# Patient Record
Sex: FEMALE
[De-identification: names, ages, dates, MRNs, and addresses within clinical notes are randomized per-mention and may not be internally consistent; named-entity substitution may affect disease eponyms.]

---

## 2022-10-17 ENCOUNTER — HOME CARE VISIT (OUTPATIENT)
Dept: SCHEDULING | Facility: HOME HEALTH | Age: 87
End: 2022-10-17

## 2022-10-17 ENCOUNTER — HOME HEALTH ADMISSION (OUTPATIENT)
Dept: HOME HEALTH SERVICES | Facility: HOME HEALTH | Age: 87
End: 2022-10-17
Payer: MEDICARE

## 2022-10-17 VITALS
HEART RATE: 80 BPM | DIASTOLIC BLOOD PRESSURE: 78 MMHG | TEMPERATURE: 97.8 F | RESPIRATION RATE: 18 BRPM | SYSTOLIC BLOOD PRESSURE: 138 MMHG | OXYGEN SATURATION: 98 %

## 2022-10-17 PROCEDURE — 0221000100 HH NO PAY CLAIM PROCEDURE

## 2022-10-17 PROCEDURE — G0299 HHS/HOSPICE OF RN EA 15 MIN: HCPCS

## 2022-10-17 ASSESSMENT — ENCOUNTER SYMPTOMS
DYSPNEA ACTIVITY LEVEL: AFTER AMBULATING 10 - 20 FT
PAIN LOCATION - PAIN QUALITY: ACHY
HEMOPTYSIS: 0

## 2022-10-17 NOTE — HOME HEALTH
Patient seen today fo cellulitis. Pt resides in residential with 24hr caregiver assiustance. Pt is alert and oreinted to person and place. VSS, lungs cta, active bowel sounds, trace of edema noted BLE. Pt is taking clindamycin for 7days. Redness noted to LLE. Scabbed area noted to LLE 0 drainage noted. Pt has a hx of HTN, hiatal hernia, DM2 controlled with diet, CAD. POC approval received from PCP. Wound Care Provided per care plan. Pt tolerated well. Caregiver involvement: [facility] staff    Medications reconciled and all medications are available. MD notified of Severe medication interaction. No new orders at this time. Patient education provided this visit: SN educated patient and patient's caregiver on s/s of infection, fall prevention and medication .  Patient and patient caregiver verbalizes understanding via the teach back method.      Progress toward goals: progressing well    Home exercise program:     Plan for next visit: monitoring cellulitis    The following discharge planning was discussed with the patient/ patient's caregiver: DC when goals met

## 2022-10-20 ENCOUNTER — HOME CARE VISIT (OUTPATIENT)
Dept: SCHEDULING | Facility: HOME HEALTH | Age: 87
End: 2022-10-20

## 2022-10-20 PROCEDURE — G0300 HHS/HOSPICE OF LPN EA 15 MIN: HCPCS

## 2022-10-21 VITALS
HEART RATE: 66 BPM | OXYGEN SATURATION: 93 % | TEMPERATURE: 97.9 F | RESPIRATION RATE: 16 BRPM | SYSTOLIC BLOOD PRESSURE: 132 MMHG | DIASTOLIC BLOOD PRESSURE: 70 MMHG

## 2022-10-21 ASSESSMENT — ENCOUNTER SYMPTOMS
HEMOPTYSIS: 0
BOWEL INCONTINENCE: 1

## 2022-10-21 NOTE — HOME HEALTH
Patient with wound, edema, mobility and cognitive impairment    Caregiver involvement: resides in group home     Patient education provided this visit: SN educated patient and patient's caregiver on anxiety and pain managemnet techniques. Patient and patient caregiver verbalizes understanding via the teach back method.      Progress toward goals: progressing well client alert oriented times two, occasional confusion, vital signs stable, client denies pain at this time, reddened area noted to lower extremity, no drainage noted, no signs or symptoms of infection noted, edema to bilateral lower extremitied, lumg sounds clear denies cough and shortness of breath, fall precautions maintaiend     Plan for next visit: check skin integrity    discharge planning was discussed with the patient/ patient's caregiver: DC when goals met

## 2022-10-25 ENCOUNTER — HOME CARE VISIT (OUTPATIENT)
Dept: SCHEDULING | Facility: HOME HEALTH | Age: 87
End: 2022-10-25
Payer: MEDICARE

## 2022-10-25 VITALS
SYSTOLIC BLOOD PRESSURE: 134 MMHG | RESPIRATION RATE: 16 BRPM | DIASTOLIC BLOOD PRESSURE: 70 MMHG | HEART RATE: 66 BPM | TEMPERATURE: 98.1 F | OXYGEN SATURATION: 96 %

## 2022-10-25 PROCEDURE — G0300 HHS/HOSPICE OF LPN EA 15 MIN: HCPCS

## 2022-10-25 ASSESSMENT — ENCOUNTER SYMPTOMS
BOWEL INCONTINENCE: 1
HEMOPTYSIS: 0

## 2022-10-25 NOTE — HOME HEALTH
Patient with dry area to lower extremity, mobility and cogniitve impairment   Client resides in [facility]   Patient education provided this visit: SN educated patient and patient's caregiver on safe utiliazation of mobility devieces,and safe ambulation. Patient and patient caregiver verbalizes understanding via the teach back method.    Progress toward goals: progressing well client alert, occasional disorientation noted, vital signs stable, dry area noted to lower extremity, no drainage noted, no discomfort noted, trace edema noted, lung sounds clear, no cough noted, fall precautions maintained, covid positive   Plan for next visit: follow up on respiratory status   discharge planning was discussed with the patient/ patient's caregiver: DC when goals met

## 2022-10-27 ENCOUNTER — HOME CARE VISIT (OUTPATIENT)
Dept: SCHEDULING | Facility: HOME HEALTH | Age: 87
End: 2022-10-27
Payer: MEDICARE

## 2022-10-27 VITALS
HEART RATE: 57 BPM | SYSTOLIC BLOOD PRESSURE: 116 MMHG | OXYGEN SATURATION: 92 % | RESPIRATION RATE: 16 BRPM | DIASTOLIC BLOOD PRESSURE: 70 MMHG | TEMPERATURE: 97.4 F

## 2022-10-27 PROCEDURE — G0300 HHS/HOSPICE OF LPN EA 15 MIN: HCPCS

## 2022-10-27 ASSESSMENT — ENCOUNTER SYMPTOMS
HEMOPTYSIS: 0
COUGH CHARACTERISTICS: NON-PRODUCTIVE
BOWEL INCONTINENCE: 1
COUGH: 1

## 2022-10-27 NOTE — HOME HEALTH
Patient with mobility, and cognitive impairment, incontinence, trace edema, reddened dry skin     Caregiver involvement: resides in [facility]     Patient education provided this visit: SN educated patient and patient's caregiver on fall prevention and management. Patient and patient caregiver verbalizes understanding via the teach back method.      Progress toward goals: progressing well client alert and oriented times 3, occasional confusion noted, vital signs stable, client denies pain at this time, lung sounds were diminished, dry non productive cough noted, shortnes of breath with communitcation was noted, skin intact dry reddened area noted to lower extremity trace edmea to bilateral lower extremities, fall precautions were maintained, covid positiev     Plan for next visit: respiratory assessmnet     discharge planning was discussed with the patient/ patient's caregiver: DC when goals met

## 2022-10-31 ENCOUNTER — HOME CARE VISIT (OUTPATIENT)
Dept: SCHEDULING | Facility: HOME HEALTH | Age: 87
End: 2022-10-31
Payer: MEDICARE

## 2022-10-31 VITALS
OXYGEN SATURATION: 97 % | DIASTOLIC BLOOD PRESSURE: 76 MMHG | SYSTOLIC BLOOD PRESSURE: 130 MMHG | RESPIRATION RATE: 16 BRPM | HEART RATE: 74 BPM | TEMPERATURE: 98.2 F

## 2022-10-31 PROCEDURE — G0300 HHS/HOSPICE OF LPN EA 15 MIN: HCPCS

## 2022-10-31 ASSESSMENT — ENCOUNTER SYMPTOMS: HEMOPTYSIS: 0

## 2022-10-31 NOTE — HOME HEALTH
Patient with covid 23, mobility and cognitive impairment   client resides in [facility]   Patient education provided this visit: SN educated patient and patient's caregiver on pain management and signs and symptoms of infection. Patient and patient caregiver verbalizes understanding via the teach back method.    Progress toward goals: progressing well client alert and oriented times 2, vital signs were stable, client denies pain at this time, reddened dry itchy area noted to lower extermity no drainage noted no signs or symptoms of infection, no edema noted, lung sounds were clear to auscultation, dry none productive cough noted, no shortness of breath was noted, fall preacutions maintained   Plan for next visit: respiratory assessment, folow up on covid 19   discharge planning was discussed with the patient/ patient's caregiver: DC when goals met

## 2022-11-02 ENCOUNTER — HOME CARE VISIT (OUTPATIENT)
Dept: HOME HEALTH SERVICES | Facility: HOME HEALTH | Age: 87
End: 2022-11-02
Payer: MEDICARE

## 2022-11-02 PROCEDURE — G0299 HHS/HOSPICE OF RN EA 15 MIN: HCPCS

## 2022-11-03 VITALS
DIASTOLIC BLOOD PRESSURE: 76 MMHG | OXYGEN SATURATION: 97 % | RESPIRATION RATE: 16 BRPM | HEART RATE: 74 BPM | TEMPERATURE: 98.2 F | SYSTOLIC BLOOD PRESSURE: 130 MMHG

## 2022-11-03 ASSESSMENT — ENCOUNTER SYMPTOMS
PAIN LOCATION - PAIN QUALITY: ACHY
HEMOPTYSIS: 0
DYSPNEA ACTIVITY LEVEL: AFTER AMBULATING MORE THAN 20 FT

## 2022-11-03 NOTE — HOME HEALTH
Patient with mobility, and cognitive impairment, incontinence, trace edema, COVID, Caregiver involvement: resides in [facility] Patient education provided this visit: SN educated patient and patient's caregiver on COVID precautions, disease process and medications. Patient and patient caregiver verbalizes understanding via the teach back method.  Progress toward goals: progressing well client alert and oriented times 3, occasional confusion noted, VSS, wheezing note right lower lobe and left upper lobe-dry non productive cough noted, shortnes of breath noted with mod exertion  skin intact dry reddened area noted to lower extremity trace edmea to bilateral lower extremities,  covid positiev Plan for next visit: respiratory assessmnet discharge planning was discussed with the patient/ patient's caregiver: DC when goals met

## 2022-11-09 ENCOUNTER — HOME CARE VISIT (OUTPATIENT)
Dept: HOME HEALTH SERVICES | Facility: HOME HEALTH | Age: 87
End: 2022-11-09
Payer: MEDICARE

## 2022-11-09 VITALS
OXYGEN SATURATION: 97 % | DIASTOLIC BLOOD PRESSURE: 74 MMHG | RESPIRATION RATE: 18 BRPM | HEART RATE: 74 BPM | TEMPERATURE: 97.6 F | SYSTOLIC BLOOD PRESSURE: 116 MMHG

## 2022-11-09 PROCEDURE — G0299 HHS/HOSPICE OF RN EA 15 MIN: HCPCS

## 2022-11-09 ASSESSMENT — ENCOUNTER SYMPTOMS
DYSPNEA ACTIVITY LEVEL: AFTER AMBULATING MORE THAN 20 FT
HEMOPTYSIS: 0

## 2022-11-09 NOTE — HOME HEALTH
Patient seen today for sn discharge, goals met. Caregiver involvement: resides in [facility] [place] Patient education provided this visit: SN educated patient and patient's caregiver are kmowledgable  with s/s of  COVID,s/s of infection, disease process and medications. Patient and patient caregiver verbalizes understanding via the teach back method. Progress toward goals: goals met progressing well client alert and oriented times 3, occasional confusion noted, VSS, lungs cta, trace of edemable.    SN goals met discharged from agency

## 2023-05-01 ENCOUNTER — HOME HEALTH ADMISSION (OUTPATIENT)
Dept: HOME HEALTH SERVICES | Facility: HOME HEALTH | Age: 88
End: 2023-05-01
Payer: MEDICARE

## 2023-05-02 ENCOUNTER — HOME CARE VISIT (OUTPATIENT)
Dept: SCHEDULING | Facility: HOME HEALTH | Age: 88
End: 2023-05-02
Payer: MEDICARE

## 2023-05-02 PROCEDURE — G0153 HHCP-SVS OF S/L PATH,EA 15MN: HCPCS

## 2023-05-06 VITALS
RESPIRATION RATE: 17 BRPM | OXYGEN SATURATION: 97 % | DIASTOLIC BLOOD PRESSURE: 69 MMHG | HEART RATE: 74 BPM | SYSTOLIC BLOOD PRESSURE: 119 MMHG | TEMPERATURE: 97.8 F

## 2023-05-06 ASSESSMENT — ENCOUNTER SYMPTOMS
HEMOPTYSIS: 0
DYSPNEA ACTIVITY LEVEL: AFTER AMBULATING MORE THAN 20 FT
TROUBLE SWALLOWING: 1
DYSPNEA WITH EXERTION: 1

## 2023-05-13 ENCOUNTER — HOME CARE VISIT (OUTPATIENT)
Dept: HOME HEALTH SERVICES | Facility: HOME HEALTH | Age: 88
End: 2023-05-13
Payer: MEDICARE

## 2023-05-13 PROCEDURE — G0153 HHCP-SVS OF S/L PATH,EA 15MN: HCPCS

## 2023-05-18 ENCOUNTER — HOME CARE VISIT (OUTPATIENT)
Dept: HOME HEALTH SERVICES | Facility: HOME HEALTH | Age: 88
End: 2023-05-18
Payer: MEDICARE

## 2023-05-18 PROCEDURE — G0153 HHCP-SVS OF S/L PATH,EA 15MN: HCPCS

## 2023-05-21 VITALS — SYSTOLIC BLOOD PRESSURE: 116 MMHG | TEMPERATURE: 97.9 F | DIASTOLIC BLOOD PRESSURE: 72 MMHG

## 2023-05-26 ENCOUNTER — HOME CARE VISIT (OUTPATIENT)
Dept: HOME HEALTH SERVICES | Facility: HOME HEALTH | Age: 88
End: 2023-05-26
Payer: MEDICARE

## 2023-05-26 VITALS
RESPIRATION RATE: 17 BRPM | DIASTOLIC BLOOD PRESSURE: 71 MMHG | TEMPERATURE: 97.9 F | SYSTOLIC BLOOD PRESSURE: 120 MMHG | HEART RATE: 71 BPM

## 2023-05-26 PROCEDURE — G0153 HHCP-SVS OF S/L PATH,EA 15MN: HCPCS

## 2023-05-30 VITALS
DIASTOLIC BLOOD PRESSURE: 78 MMHG | HEART RATE: 76 BPM | TEMPERATURE: 97.8 F | SYSTOLIC BLOOD PRESSURE: 113 MMHG | OXYGEN SATURATION: 98 % | RESPIRATION RATE: 18 BRPM

## 2023-05-30 ASSESSMENT — ENCOUNTER SYMPTOMS
HEMOPTYSIS: 0
DYSPNEA ACTIVITY LEVEL: AFTER AMBULATING MORE THAN 20 FT
TROUBLE SWALLOWING: 1

## 2023-05-31 NOTE — HOME HEALTH
Pt seen for d/c visit. Pt's son/POA present for session. Therapist educated pt's son on complications associated with opiods/narcots; diabetic foot care and monitoring; fall prevention; aspiration precautions; and safe swallow compensatory strategies. Pt's son able to teach back information wtih 100% accuracy. Therapist educated pt's son re: pt performance during sessions and recommended that pt continue with regular/thin diet and crushed medications with use of safe swallow compensatory strategies. Son verbalized understanding. Pt tolerating current diet without overt s/s of aspiration or penetration and will be d/c from [address] [place] [address].

## 2023-12-28 ENCOUNTER — HOME HEALTH ADMISSION (OUTPATIENT)
Dept: HOME HEALTH SERVICES | Facility: HOME HEALTH | Age: 88
End: 2023-12-28
Payer: MEDICARE

## 2024-01-02 ENCOUNTER — HOME CARE VISIT (OUTPATIENT)
Dept: HOME HEALTH SERVICES | Facility: HOME HEALTH | Age: 89
End: 2024-01-02

## 2024-01-06 ENCOUNTER — HOME CARE VISIT (OUTPATIENT)
Dept: SCHEDULING | Facility: HOME HEALTH | Age: 89
End: 2024-01-06
Payer: MEDICARE

## 2024-01-06 VITALS
DIASTOLIC BLOOD PRESSURE: 62 MMHG | OXYGEN SATURATION: 96 % | SYSTOLIC BLOOD PRESSURE: 96 MMHG | TEMPERATURE: 97.2 F | HEART RATE: 60 BPM | RESPIRATION RATE: 17 BRPM

## 2024-01-06 PROCEDURE — G0151 HHCP-SERV OF PT,EA 15 MIN: HCPCS

## 2024-01-06 ASSESSMENT — ENCOUNTER SYMPTOMS: DYSPNEA ACTIVITY LEVEL: AFTER AMBULATING 10 - 20 FT

## 2024-01-06 NOTE — HOME HEALTH
Pt is an 89 yo RHD female referred to PT with a dx of dementia and decreased activity.  Pt lives in [facility] and was in her manual WC in the dining room upon PT arrival. [facility] is under construction and pt's room was not accessable for safety assessment. Pt's evaluation was performed in the Discovery room, a private sittng area without AD.  PMH: DM, HTN, hyperlipidemia, dementia  Pt presents with forward head, forward lumbar flexion with sitting and standing. Pt is non-ambulatory. Pt demonstrates BLE muscle weakness, impaired dynamic balance, limited standing tolerance and decreased endurance causing difficulty performing safe functional transfers from various surfaces throughout home, standing activities and bed mobility without assistance as able to do in PLOF. Butler Balance score of 4 indicates a high fall risk. Pt is homebound due to being at high risk for falls and requires the assistance of another person to leave home safely. Major drug to drug interactions: traMADol and escitalopram, traMADol and mirtazapine, escitalopram and mirtazapine

## 2024-01-11 ENCOUNTER — HOME CARE VISIT (OUTPATIENT)
Dept: SCHEDULING | Facility: HOME HEALTH | Age: 89
End: 2024-01-11
Payer: MEDICARE

## 2024-01-11 PROCEDURE — G0157 HHC PT ASSISTANT EA 15: HCPCS

## 2024-01-12 ENCOUNTER — HOME CARE VISIT (OUTPATIENT)
Dept: SCHEDULING | Facility: HOME HEALTH | Age: 89
End: 2024-01-12
Payer: MEDICARE

## 2024-01-12 PROCEDURE — G0157 HHC PT ASSISTANT EA 15: HCPCS

## 2024-01-13 VITALS
TEMPERATURE: 97.3 F | OXYGEN SATURATION: 94 % | HEART RATE: 60 BPM | RESPIRATION RATE: 15 BRPM | DIASTOLIC BLOOD PRESSURE: 60 MMHG | OXYGEN SATURATION: 94 % | HEART RATE: 59 BPM | RESPIRATION RATE: 15 BRPM | DIASTOLIC BLOOD PRESSURE: 68 MMHG | SYSTOLIC BLOOD PRESSURE: 118 MMHG | SYSTOLIC BLOOD PRESSURE: 122 MMHG | TEMPERATURE: 97.8 F

## 2024-01-13 NOTE — HOME HEALTH
Rx:  Dementia capable approach for sequencing of rehab task and completion of the rehab session.     Constant cuing, redirection, and cuing for participation in, and the completion of the rehab session secondary to conative-mental stratus.       Emphasis on sequencing for sit <--> stands(B UE-LE.) all for ease of care and fall prevention.     PROM-AAROM to BLE, D 1-D 2 PNF pattern with prolonged stretch and hold in the end range.         - Distraction + oscillation while in supine position.     Sitting trunk-core stability, all motions left-right-forward-backward-CCW rotation, with patient reaching across midline and out of base of support.       Standing dynamic balance-proprioceptive training: (grab bar.).  Side stepping left - right, toe-heel raises, mini squats, unilateral standing, Lunges left - right.    2ww ambulation x 20' feet, Mod assistance, constant cuing for B UE-LE sequencing required.

## 2024-01-13 NOTE — HOME HEALTH
Rx:  Dementia capable approach for sequencing of rehab task and completion of the rehab session.     Constant cuing, redirection, and cuing for participation in, and the completion of the rehab session secondary to conative-mental stratus.       Emphasis on sequencing for sit <--> stands(B UE-LE.) all for ease of care and fall prevention.     PROM-AAROM to BLE, D 1-D 2 PNF pattern with prolonged stretch and hold in the end range.         - Distraction + oscillation while in supine position.     Sitting trunk-core stability, all motions left-right-forward-backward-CCW rotation, with patient reaching across midline and out of base of support.       Standing dynamic balance-proprioceptive training: (grab bar.).  Side stepping left - right, toe-heel raises, mini squats, unilateral standing, Lunges left – right.    2ww ambulation x 20' feet, Mod assistance, constant cuing for B UE-LE sequencing required.

## 2024-01-15 ENCOUNTER — HOME CARE VISIT (OUTPATIENT)
Dept: SCHEDULING | Facility: HOME HEALTH | Age: 89
End: 2024-01-15
Payer: MEDICARE

## 2024-01-15 PROCEDURE — G0157 HHC PT ASSISTANT EA 15: HCPCS

## 2024-01-16 VITALS
SYSTOLIC BLOOD PRESSURE: 114 MMHG | DIASTOLIC BLOOD PRESSURE: 60 MMHG | TEMPERATURE: 97.1 F | HEART RATE: 60 BPM | OXYGEN SATURATION: 94 %

## 2024-01-17 NOTE — HOME HEALTH
Dementia capable approach and redirection all required through out the session.     Parallel bars:  Side stepping left - right, toe-heel raises, mini squats, unilateral standing, Lunges left – right.  Sit <--> stands cuing for B UE-LE sequencing needed.     2ww gait training x 25' feet (2).     A)  Dementia capable approach for sequencing of rehab task and completion of the rehab session.     Constant cuing, redirection, and cuing for participation in, and the completion of the rehab session secondary to conative-mental stratus.     Min assistance required with standing dynamic activity. No carry over from one session to the next demonstrated.     P)  Construe with current plan of care for increased patient independence, fall prevention and ease of care.

## 2024-01-18 ENCOUNTER — HOME CARE VISIT (OUTPATIENT)
Dept: SCHEDULING | Facility: HOME HEALTH | Age: 89
End: 2024-01-18
Payer: MEDICARE

## 2024-01-18 PROCEDURE — G0157 HHC PT ASSISTANT EA 15: HCPCS

## 2024-01-21 VITALS
DIASTOLIC BLOOD PRESSURE: 80 MMHG | OXYGEN SATURATION: 98 % | TEMPERATURE: 97.8 F | HEART RATE: 70 BPM | SYSTOLIC BLOOD PRESSURE: 140 MMHG | RESPIRATION RATE: 17 BRPM

## 2024-01-23 ENCOUNTER — HOME CARE VISIT (OUTPATIENT)
Dept: HOME HEALTH SERVICES | Facility: HOME HEALTH | Age: 89
End: 2024-01-23
Payer: MEDICARE

## 2024-01-23 PROCEDURE — G0157 HHC PT ASSISTANT EA 15: HCPCS

## 2024-01-25 ENCOUNTER — HOME CARE VISIT (OUTPATIENT)
Dept: SCHEDULING | Facility: HOME HEALTH | Age: 89
End: 2024-01-25
Payer: MEDICARE

## 2024-01-25 PROCEDURE — G0157 HHC PT ASSISTANT EA 15: HCPCS

## 2024-01-27 VITALS
RESPIRATION RATE: 17 BRPM | RESPIRATION RATE: 16 BRPM | DIASTOLIC BLOOD PRESSURE: 70 MMHG | SYSTOLIC BLOOD PRESSURE: 104 MMHG | OXYGEN SATURATION: 98 % | TEMPERATURE: 97.1 F | HEART RATE: 60 BPM | HEART RATE: 60 BPM | SYSTOLIC BLOOD PRESSURE: 150 MMHG | OXYGEN SATURATION: 95 % | TEMPERATURE: 98.2 F | DIASTOLIC BLOOD PRESSURE: 64 MMHG

## 2024-01-29 ENCOUNTER — HOME CARE VISIT (OUTPATIENT)
Dept: SCHEDULING | Facility: HOME HEALTH | Age: 89
End: 2024-01-29
Payer: MEDICARE

## 2024-01-29 PROCEDURE — G0151 HHCP-SERV OF PT,EA 15 MIN: HCPCS

## 2024-01-30 VITALS
TEMPERATURE: 97.2 F | RESPIRATION RATE: 17 BRPM | SYSTOLIC BLOOD PRESSURE: 134 MMHG | HEART RATE: 76 BPM | OXYGEN SATURATION: 96 % | DIASTOLIC BLOOD PRESSURE: 76 MMHG

## 2024-01-30 NOTE — HOME HEALTH
Pt was wheapy about her family \"leaving her behind.\"  Patient instructed to continue HEP at least 2 times daily for continued progression and benefits. PT spoke to nursing staff and they state she has been more I with transfers but will have \"bad days\" when she can not sequence well. Pt has ambulated with PTA 20' Mod A with seequencing and VC needed for sit to stand transfers.  Pt has met goals as outlined in the initial evaulation. There is minimal carryover secondary to cognitive deficits/dementia.

## 2024-03-04 ENCOUNTER — HOME CARE VISIT (OUTPATIENT)
Dept: HOME HEALTH SERVICES | Facility: HOME HEALTH | Age: 89
End: 2024-03-04
Payer: MEDICARE